# Patient Record
Sex: FEMALE
[De-identification: names, ages, dates, MRNs, and addresses within clinical notes are randomized per-mention and may not be internally consistent; named-entity substitution may affect disease eponyms.]

---

## 2019-02-08 ENCOUNTER — TRANSCRIPTION ENCOUNTER (OUTPATIENT)
Age: 59
End: 2019-02-08

## 2020-04-15 ENCOUNTER — TRANSCRIPTION ENCOUNTER (OUTPATIENT)
Age: 60
End: 2020-04-15

## 2021-02-17 PROBLEM — Z00.00 ENCOUNTER FOR PREVENTIVE HEALTH EXAMINATION: Status: ACTIVE | Noted: 2021-02-17

## 2021-02-22 ENCOUNTER — APPOINTMENT (OUTPATIENT)
Dept: OTOLARYNGOLOGY | Facility: CLINIC | Age: 61
End: 2021-02-22
Payer: COMMERCIAL

## 2021-02-22 DIAGNOSIS — Z83.3 FAMILY HISTORY OF DIABETES MELLITUS: ICD-10-CM

## 2021-02-22 DIAGNOSIS — Z87.39 PERSONAL HISTORY OF OTHER DISEASES OF THE MUSCULOSKELETAL SYSTEM AND CONNECTIVE TISSUE: ICD-10-CM

## 2021-02-22 DIAGNOSIS — Z81.1 FAMILY HISTORY OF ALCOHOL ABUSE AND DEPENDENCE: ICD-10-CM

## 2021-02-22 DIAGNOSIS — Z82.49 FAMILY HISTORY OF ISCHEMIC HEART DISEASE AND OTHER DISEASES OF THE CIRCULATORY SYSTEM: ICD-10-CM

## 2021-02-22 PROCEDURE — 99072 ADDL SUPL MATRL&STAF TM PHE: CPT

## 2021-02-22 PROCEDURE — 92557 COMPREHENSIVE HEARING TEST: CPT

## 2021-02-22 PROCEDURE — 99213 OFFICE O/P EST LOW 20 MIN: CPT

## 2021-02-22 PROCEDURE — 92567 TYMPANOMETRY: CPT

## 2021-02-22 RX ORDER — BACILLUS COAGULANS/INULIN 1B-250 MG
CAPSULE ORAL
Refills: 0 | Status: ACTIVE | COMMUNITY

## 2021-02-22 RX ORDER — TURMERIC/TURMERIC EXT/PEPR EXT 900-100 MG
CAPSULE ORAL
Refills: 0 | Status: ACTIVE | COMMUNITY

## 2021-02-22 RX ORDER — ASCORBIC ACID 500 MG
TABLET ORAL
Refills: 0 | Status: ACTIVE | COMMUNITY

## 2021-02-22 NOTE — PHYSICAL EXAM
[FreeTextEntry1] : The patient was alert and oriented and in no distress.\par Voice was clear.\par \par Face:\par The patient had no facial asymmetry or mass.\par The skin was unremarkable.\par \par Eyes:\par The pupils were equal round and reactive to light and accommodation.\par There was no significant nystagmus or disconjugate gaze noted.\par \par Nose: \par The external nose had no significant deformity.  There was no facial tenderness.  On anterior rhinoscopy, the nasal mucosa was clear.  The anterior septum was midline.  There were no visualized polyps purulence  or masses.\par \par Oral cavity:\par The oral mucosa was normal.\par The oral and base of tongue were clear and without mass.\par The gingival and buccal mucosa were moist and without lesions.\par The palate moved well.\par There was no cleft to the palate.\par There appeared to be good salivary flow.  \par There was no pus, erythema or mass in the oral cavity.\par \par \par Ears:\par The external ears were normal without deformity.\par The ear canals were clear.\par The tympanic membranes were intact and normal.\par \par Neck: \par The neck was symmetrical.\par The parotid and submandibular glands were normal without masses.\par The trachea was midline and there was no unusual crepitus.\par The thyroid was smooth and nontender and no masses were palpated.\par There was no significant cervical adenopathy.\par \par \par Neuro:\par Neurologically, the patient was awake, alert, and oriented to person, place and time. There were no obvious focal neurologic abnormalities.  Cranial nerves II through XII were grossly intact.\par \par \par TMJ:\par The temporomandibular joints were nontender.\par There was no abnormal crepitus \par She has an open bite deformity. [de-identified] : A complete audiometric evaluation was done and showed bilaterally almost symmetric dishpan sensory neural hearing losses with normal tympanograms

## 2021-02-22 NOTE — ASSESSMENT
[FreeTextEntry1] : It was my impression that she has a normal exam at this time. It certainly is possible that this was an otitis externa, however her descriptions are most consistent with musculoskeletal and likely temporomandibular joint dysfunction. She has an open bite deformity.\par I explained this to her.\par It was my impression is that the patient's symptoms were from the temporomandibular joint.\par I recommended warm compresses, a soft diet, and anti-inflammatories.\par I would recommend following up further with the patient's dentist for a possible bruxism appliance if this fails to respond.  She has an appliance and I suggested going back to using it or following with her dentist.\par She has dishpan sensorineural hearing losses, and I reviewed the audiogram with her.  She will repeat her audiogram in a year and depending on the results consider hearing aids.\par

## 2021-02-22 NOTE — CONSULT LETTER
[FreeTextEntry2] : MYAT ROJAS\par  [FreeTextEntry1] : \par \par Dear  Dr. CARMENZA ROJAS,\par \par I had the pleasure of seeing your patient today.  \par Please see my note below.\par \par \par Thank you very much for allowing me to participate in the care of your patient.\par \par Sincerely,\par \par \par Monty Culver MD\par [facility]\par [facility]\par  [facility]\par \par

## 2021-02-22 NOTE — HISTORY OF PRESENT ILLNESS
[de-identified] : TERESA OLIVAS is a 60 year old female who comes in complaining of Having the acute onset of left-sided otalgia at night on February 4. She was put on Ciprodex drops and had some improvement after about a week, but then she started to have symptoms on the right as well. She had been recommended to take Meloxicam but did not. Her symptoms now are better and she comes in for repeat evaluation.  I had seen her for years ago after a sinus infection and she has not had problems again.  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  The patient had no other ear nose or throat complaints at this visit.\par

## 2021-02-23 ENCOUNTER — RESULT CHARGE (OUTPATIENT)
Age: 61
End: 2021-02-23

## 2022-01-08 ENCOUNTER — TRANSCRIPTION ENCOUNTER (OUTPATIENT)
Age: 62
End: 2022-01-08

## 2022-04-11 ENCOUNTER — APPOINTMENT (OUTPATIENT)
Dept: OTOLARYNGOLOGY | Facility: CLINIC | Age: 62
End: 2022-04-11
Payer: COMMERCIAL

## 2022-04-11 DIAGNOSIS — H90.3 SENSORINEURAL HEARING LOSS, BILATERAL: ICD-10-CM

## 2022-04-11 DIAGNOSIS — H61.22 IMPACTED CERUMEN, LEFT EAR: ICD-10-CM

## 2022-04-11 DIAGNOSIS — M26.609 UNSPECIFIED TEMPOROMANDIBULAR JOINT DISORDER: ICD-10-CM

## 2022-04-11 DIAGNOSIS — H92.02 OTALGIA, LEFT EAR: ICD-10-CM

## 2022-04-11 DIAGNOSIS — J31.0 CHRONIC RHINITIS: ICD-10-CM

## 2022-04-11 PROCEDURE — 69210 REMOVE IMPACTED EAR WAX UNI: CPT

## 2022-04-11 PROCEDURE — 31231 NASAL ENDOSCOPY DX: CPT

## 2022-04-11 PROCEDURE — 92557 COMPREHENSIVE HEARING TEST: CPT

## 2022-04-11 PROCEDURE — 92567 TYMPANOMETRY: CPT

## 2022-04-11 PROCEDURE — 99214 OFFICE O/P EST MOD 30 MIN: CPT | Mod: 25

## 2022-04-11 NOTE — PHYSICAL EXAM
[FreeTextEntry1] : \par The patient was alert and oriented and in no distress.\par Voice was clear.\par \par Face:\par The patient had no facial asymmetry or mass.\par The skin was unremarkable.\par \par Eyes:\par The pupils were equal round and reactive to light and accommodation.\par There was no significant nystagmus or disconjugate gaze noted.\par \par Nose: \par The external nose had no significant deformity.  There was no facial tenderness.  On anterior rhinoscopy, the nasal mucosa was clear.  The anterior septum was midline.  There were no visualized polyps purulence  or masses.\par \par Oral cavity:\par The oral mucosa was normal.\par The oral and base of tongue were clear and without mass.\par The gingival and buccal mucosa were moist and without lesions.\par The palate moved well.\par There was no cleft to the palate.\par There appeared to be good salivary flow.  \par There was no pus, erythema or mass in the oral cavity.\par \par \par Ears:\par Procedure note: Debridement of cerumen impaction left ear   12732\par \par Dx:  symptomatic cerumen impaction left ear \par Both external ears were normal.\par There was a dense cerumen impaction in the left ear.  This was cleared microscopically without trauma, using suction and curettes.  Beyond that, both ear canals were clear and both eardrums were intact and mobile.\par Neck: \par The neck was symmetrical.\par The parotid and submandibular glands were normal without masses.\par The trachea was midline and there was no unusual crepitus.\par The thyroid was smooth and nontender and no masses were palpated.\par There was no significant cervical adenopathy.\par \par \par Neuro:\par Neurologically, the patient was awake, alert, and oriented to person, place and time. There were no obvious focal neurologic abnormalities.  Cranial nerves II through XII were grossly intact.\par \par \par TMJ:\par The temporomandibular joints significantly tender, more on the left than on the right with oral signs of bruxism. [de-identified] : Nasal endoscopy: \par \par Endoscopy was done with Covid precautions and with video. All risks and benefits were discussed with the patient and consent obtained.\par \par Procedure Note:\par \par Nasal endoscopy was done with topical anesthesia and a fiberoptic endoscope.\par Indication: Nasal congestion, rule out sinusitis.\par Procedure: The nasal cavity was anesthetized with topical Afrin and Pontocaine. An  endoscope was used and inserted into the nasal cavity. \par Endocoscopy was performed to inspect the interior of the nasal cavity, the nasal septum,  the middle and superior meati, the inferior, middle and superior turbinates, and the spheno-ethmoidal  recesses, the nasopharynx and eustachian tube orifices bilaterally\par  This showed that the nasal mucosa was slightly boggy.  There was a moderate S-shaped septal deflection.  However, the middle meati were open.  There were no polyps, purulence or masses.  The eustachian tube orifices were clear.  The nasopharynx was benign and without mass.  The inferior and middle turbinates were slightly boggy, the superior meati were normal and the sphenoethmoidal recesses were without evidence of disease.\par A complete audiogram was ordered, done and reviewed with the patient.  This was compared to her previous exam and showed the dish pan sensorineural hearing losses with type a tympanograms bilaterally.  There was minimal change since last year.

## 2022-04-11 NOTE — HISTORY OF PRESENT ILLNESS
[de-identified] : TERESA OLIVAS is a 61 year old female who comes in complaining of persistent fullness in both ears.  This started on March 10.  She had some slight improvement with doxycycline and Cipro eardrops.  However both ears still feel clogged.  She notes some preauricular discomfort.  I had last seen her a year ago with dish pan sensorineural hearing losses.  She notes that for each the last 3 or 4 years she has been getting an ear infection in January or February.  I had also seen her with a temporomandibular joint dysfunction last year.  The patient had no other ear nose or throat complaints at this visit.

## 2022-04-11 NOTE — REASON FOR VISIT
[Subsequent Evaluation] : a subsequent evaluation for [Ear Pain] : ear pain [FreeTextEntry2] : ears feel clogged

## 2022-04-11 NOTE — CONSULT LETTER
[FreeTextEntry2] : MYAT ROJAS\par  [FreeTextEntry1] : \par \par Dear  Dr. CARMENZA ROJAS,\par \par I had the pleasure of seeing your patient today.  \par Please see my note below.\par \par \par Thank you very much for allowing me to participate in the care of your patient.\par \par Sincerely,\par \par \par Monty Culver MD\par [facility]\par [facility]\par  [facility]\par \par

## 2022-04-13 PROBLEM — H90.3 SENSORINEURAL HEARING LOSS (SNHL) OF BOTH EARS: Status: ACTIVE | Noted: 2022-04-11
